# Patient Record
Sex: MALE | Race: WHITE | NOT HISPANIC OR LATINO | Employment: FULL TIME | URBAN - METROPOLITAN AREA
[De-identification: names, ages, dates, MRNs, and addresses within clinical notes are randomized per-mention and may not be internally consistent; named-entity substitution may affect disease eponyms.]

---

## 2018-02-20 ENCOUNTER — OFFICE VISIT (OUTPATIENT)
Dept: FAMILY MEDICINE CLINIC | Facility: CLINIC | Age: 38
End: 2018-02-20

## 2018-02-20 VITALS
DIASTOLIC BLOOD PRESSURE: 78 MMHG | HEART RATE: 94 BPM | HEIGHT: 67 IN | TEMPERATURE: 98 F | BODY MASS INDEX: 27.47 KG/M2 | SYSTOLIC BLOOD PRESSURE: 116 MMHG | RESPIRATION RATE: 16 BRPM | WEIGHT: 175 LBS

## 2018-02-20 DIAGNOSIS — J30.89 CHRONIC NON-SEASONAL ALLERGIC RHINITIS, UNSPECIFIED TRIGGER: Primary | ICD-10-CM

## 2018-02-20 PROCEDURE — 99213 OFFICE O/P EST LOW 20 MIN: CPT | Performed by: FAMILY MEDICINE

## 2018-02-20 RX ORDER — MONTELUKAST SODIUM 10 MG/1
10 TABLET ORAL NIGHTLY
Qty: 30 TABLET | Refills: 11 | Status: SHIPPED | OUTPATIENT
Start: 2018-02-20 | End: 2021-11-18

## 2018-02-20 NOTE — PROGRESS NOTES
"Subjective   Feliciano Billings is a 37 y.o. male.     CC: Sinus Issues    History of Present Illness     Pt comes in today reporting that he, since being moved to an older building at work with dust and chemicals, has had 3 sinus infections and a lot of nasal congestion and stuffiness (L>R). Has been using a NettiPott and is using Saline. Using Breath-Right strips, too. Pt did get allergy shots as a kid.      The following portions of the patient's history were reviewed and updated as appropriate: allergies, current medications, past family history, past medical history, past social history, past surgical history and problem list.    Review of Systems   Constitutional: Negative for activity change, chills, fatigue and fever.   HENT: Positive for congestion, postnasal drip and rhinorrhea.    Respiratory: Negative for cough and shortness of breath.    Cardiovascular: Negative for chest pain and palpitations.   Gastrointestinal: Negative for abdominal pain.   Endocrine: Negative for cold intolerance.   Psychiatric/Behavioral: Negative for behavioral problems and dysphoric mood. The patient is not nervous/anxious.      /78  Pulse 94  Temp 98 °F (36.7 °C) (Oral)   Resp 16  Ht 170.2 cm (67\")  Wt 79.4 kg (175 lb)  BMI 27.41 kg/m2    Objective   Physical Exam   Constitutional: He appears well-developed and well-nourished.   HENT:   Nose: Mucosal edema and nasal deformity (slight rightward deviation) present.   Neck: Neck supple. No thyromegaly present.   Cardiovascular: Normal rate and regular rhythm.    No murmur heard.  Pulmonary/Chest: Effort normal and breath sounds normal.   Abdominal: Bowel sounds are normal.   Psychiatric: He has a normal mood and affect. His behavior is normal.   Nursing note and vitals reviewed.      Assessment/Plan   Feliciano was seen today for sinusitis.    Diagnoses and all orders for this visit:    Chronic non-seasonal allergic rhinitis, unspecified trigger  -     montelukast " (SINGULAIR) 10 MG tablet; Take 1 tablet by mouth Every Night.    Pt to also start Nasacort and Allegra and gave # to Dr. Cris Martinez if needed.

## 2018-02-20 NOTE — PROGRESS NOTES
"Subjective   Feliciano Billings is a 37 y.o. male.     CC: URI    History of Present Illness     Feliciano Billings 37 y.o. male who presents for evaluation of {AD URI:1355284}. Symptoms include {AD URI c/o:73080}.  Onset of symptoms was {0-10:96843} {Time; units w/plural:11} ago, {course:17::\"unchanged\"} since that time. Patient denies {AD URI denies:20887}.   Evaluation to date: {Symptoms; uri:20318} Treatment to date:  {ADURITX:20322}.       The following portions of the patient's history were reviewed and updated as appropriate: allergies, current medications, past family history, past medical history, past social history, past surgical history and problem list.    Review of Systems   Constitutional: Negative for activity change, chills, fatigue and fever.   HENT: Positive for congestion, postnasal drip and sinus pressure.    Respiratory: Positive for cough. Negative for chest tightness.    Cardiovascular: Negative for chest pain and palpitations.   Gastrointestinal: Negative for abdominal pain and nausea.   Endocrine: Negative for cold intolerance and polydipsia.   Psychiatric/Behavioral: Negative for behavioral problems and dysphoric mood.   All other systems reviewed and are negative.    /78  Pulse 94  Temp 98 °F (36.7 °C) (Oral)   Resp 16  Ht 170.2 cm (67\")  Wt 79.4 kg (175 lb)  BMI 27.41 kg/m2    Objective   Physical Exam   Constitutional: He appears well-developed and well-nourished.   HENT:   Nose: Right sinus exhibits maxillary sinus tenderness. Left sinus exhibits maxillary sinus tenderness.   Neck: Neck supple. No thyromegaly present.   Cardiovascular: Normal rate and regular rhythm.    No murmur heard.  Pulmonary/Chest: Effort normal and breath sounds normal.   Abdominal: Bowel sounds are normal.   Psychiatric: He has a normal mood and affect. His behavior is normal.   Nursing note and vitals reviewed.      Assessment/Plan   Feliciano was seen today for sinusitis.    Diagnoses and all orders " for this visit:    Acute non-recurrent maxillary sinusitis

## 2019-08-08 ENCOUNTER — OFFICE VISIT (OUTPATIENT)
Dept: FAMILY MEDICINE CLINIC | Facility: CLINIC | Age: 39
End: 2019-08-08

## 2019-08-08 VITALS
OXYGEN SATURATION: 98 % | DIASTOLIC BLOOD PRESSURE: 85 MMHG | RESPIRATION RATE: 16 BRPM | HEIGHT: 68 IN | BODY MASS INDEX: 24.55 KG/M2 | WEIGHT: 162 LBS | HEART RATE: 78 BPM | SYSTOLIC BLOOD PRESSURE: 120 MMHG | TEMPERATURE: 98 F

## 2019-08-08 DIAGNOSIS — E78.2 MIXED HYPERLIPIDEMIA: ICD-10-CM

## 2019-08-08 DIAGNOSIS — M25.50 ARTHRALGIA, UNSPECIFIED JOINT: ICD-10-CM

## 2019-08-08 DIAGNOSIS — R19.4 BOWEL HABIT CHANGES: ICD-10-CM

## 2019-08-08 DIAGNOSIS — Z00.00 ANNUAL PHYSICAL EXAM: ICD-10-CM

## 2019-08-08 DIAGNOSIS — R60.0 LOCALIZED EDEMA: Primary | ICD-10-CM

## 2019-08-08 DIAGNOSIS — Z80.0 FAMILY HISTORY OF COLON CANCER: ICD-10-CM

## 2019-08-08 PROCEDURE — 99214 OFFICE O/P EST MOD 30 MIN: CPT | Performed by: FAMILY MEDICINE

## 2019-08-08 NOTE — PROGRESS NOTES
"Subjective   Feliciano Billings is a 39 y.o. male.     CC: Swelling/Rash    History of Present Illness     Pt comes in today with a roughly 12 month h/o swelling of the hands and feet, along with a more recent rash in the same region.  Pt reports a roughly 10 yr h/o off/on hand swelling and aches yet has come back about a year ago. 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 Getting some \"brain fog\" and fatigue issues, too.  Pt has had some 1 year h/o some bowel changes/bloating, etc, and father  63 (dx age 56) of colon cancer.    FH: mom and brother with HYPERthyroidism.    The following portions of the patient's history were reviewed and updated as appropriate: allergies, current medications, past family history, past medical history, past social history, past surgical history and problem list.    Review of Systems   Constitutional: Negative for activity change, chills, fatigue and fever.   Respiratory: Negative for cough and shortness of breath.    Cardiovascular: Negative for chest pain and palpitations.        Hand/feet swelling   Gastrointestinal: Negative for abdominal pain.   Endocrine: Negative for cold intolerance.   Psychiatric/Behavioral: Negative for behavioral problems and dysphoric mood. The patient is not nervous/anxious.        /85   Pulse 78   Temp 98 °F (36.7 °C) (Oral)   Resp 16   Ht 172.7 cm (68\")   Wt 73.5 kg (162 lb)   SpO2 98%   BMI 24.63 kg/m²     Objective   Physical Exam   Constitutional: He appears well-developed and well-nourished.   Neck: Neck supple. No thyromegaly present.   Cardiovascular: Normal rate and regular rhythm.   No murmur heard.  Pulmonary/Chest: Effort normal and breath sounds normal.   Abdominal: Bowel sounds are normal. There is no tenderness.   Psychiatric: He has a normal mood and affect. His behavior is normal.   Nursing note and vitals reviewed.      Assessment/Plan "   Feliciano was seen today for edema and rash.    Diagnoses and all orders for this visit:    Localized edema  -     T4, Free  -     Thyroid Stimulating Immunoglobulin  -     Comprehensive metabolic panel  -     CBC and Differential  -     TSH    Arthralgia, unspecified joint  -     Rheumatoid Factor  -     IVON  -     Uric Acid    Mixed hyperlipidemia  -     Lipid panel    Annual physical exam  Comments:  for labs only, don't bill  Orders:  -     Comprehensive metabolic panel  -     CBC and Differential  -     TSH    Bowel habit changes  -     Ambulatory Referral to Gastroenterology    Family history of colon cancer  -     Ambulatory Referral to Gastroenterology    Other orders  -     Cancel: Comprehensive Metabolic Panel  -     Cancel: CBC & Differential  -     Cancel: TSH    If all labs WNL, will consider a Sleep Consult.

## 2019-08-09 LAB
ALBUMIN SERPL-MCNC: 4.6 G/DL (ref 3.5–5.2)
ALBUMIN/GLOB SERPL: 1.6 G/DL
ALP SERPL-CCNC: 125 U/L (ref 39–117)
ALT SERPL-CCNC: 22 U/L (ref 1–41)
ANA SER QL: NEGATIVE
AST SERPL-CCNC: 21 U/L (ref 1–40)
BASOPHILS # BLD AUTO: 0.05 10*3/MM3 (ref 0–0.2)
BASOPHILS NFR BLD AUTO: 0.8 % (ref 0–1.5)
BILIRUB SERPL-MCNC: 0.6 MG/DL (ref 0.2–1.2)
BUN SERPL-MCNC: 10 MG/DL (ref 6–20)
BUN/CREAT SERPL: 15.9 (ref 7–25)
CALCIUM SERPL-MCNC: 9.4 MG/DL (ref 8.6–10.5)
CHLORIDE SERPL-SCNC: 100 MMOL/L (ref 98–107)
CHOLEST SERPL-MCNC: 164 MG/DL (ref 0–200)
CO2 SERPL-SCNC: 29.7 MMOL/L (ref 22–29)
CREAT SERPL-MCNC: 0.63 MG/DL (ref 0.76–1.27)
EOSINOPHIL # BLD AUTO: 0.26 10*3/MM3 (ref 0–0.4)
EOSINOPHIL NFR BLD AUTO: 4 % (ref 0.3–6.2)
ERYTHROCYTE [DISTWIDTH] IN BLOOD BY AUTOMATED COUNT: 12.5 % (ref 12.3–15.4)
GLOBULIN SER CALC-MCNC: 2.9 GM/DL
GLUCOSE SERPL-MCNC: 101 MG/DL (ref 65–99)
HCT VFR BLD AUTO: 44.6 % (ref 37.5–51)
HDLC SERPL-MCNC: 49 MG/DL (ref 40–60)
HGB BLD-MCNC: 14 G/DL (ref 13–17.7)
IMM GRANULOCYTES # BLD AUTO: 0.01 10*3/MM3 (ref 0–0.05)
IMM GRANULOCYTES NFR BLD AUTO: 0.2 % (ref 0–0.5)
LDLC SERPL CALC-MCNC: ABNORMAL MG/DL
LYMPHOCYTES # BLD AUTO: 2.25 10*3/MM3 (ref 0.7–3.1)
LYMPHOCYTES NFR BLD AUTO: 34.3 % (ref 19.6–45.3)
MCH RBC QN AUTO: 29.2 PG (ref 26.6–33)
MCHC RBC AUTO-ENTMCNC: 31.4 G/DL (ref 31.5–35.7)
MCV RBC AUTO: 93.1 FL (ref 79–97)
MONOCYTES # BLD AUTO: 0.42 10*3/MM3 (ref 0.1–0.9)
MONOCYTES NFR BLD AUTO: 6.4 % (ref 5–12)
NEUTROPHILS # BLD AUTO: 3.57 10*3/MM3 (ref 1.7–7)
NEUTROPHILS NFR BLD AUTO: 54.3 % (ref 42.7–76)
NRBC BLD AUTO-RTO: 0.2 /100 WBC (ref 0–0.2)
PLATELET # BLD AUTO: 348 10*3/MM3 (ref 140–450)
POTASSIUM SERPL-SCNC: 5.1 MMOL/L (ref 3.5–5.2)
PROT SERPL-MCNC: 7.5 G/DL (ref 6–8.5)
RBC # BLD AUTO: 4.79 10*6/MM3 (ref 4.14–5.8)
RHEUMATOID FACT SERPL-ACNC: <10 IU/ML (ref 0–13.9)
SODIUM SERPL-SCNC: 140 MMOL/L (ref 136–145)
T4 FREE SERPL-MCNC: 1.08 NG/DL (ref 0.93–1.7)
TRIGL SERPL-MCNC: 423 MG/DL (ref 0–150)
TSH SERPL DL<=0.005 MIU/L-ACNC: 1.19 MIU/ML (ref 0.27–4.2)
TSI SER-ACNC: <0.1 IU/L (ref 0–0.55)
URATE SERPL-MCNC: 5.8 MG/DL (ref 3.4–7)
VLDLC SERPL CALC-MCNC: ABNORMAL MG/DL
WBC # BLD AUTO: 6.56 10*3/MM3 (ref 3.4–10.8)

## 2019-08-14 ENCOUNTER — TELEPHONE (OUTPATIENT)
Dept: FAMILY MEDICINE CLINIC | Facility: CLINIC | Age: 39
End: 2019-08-14

## 2019-08-14 DIAGNOSIS — R53.82 CHRONIC FATIGUE: Primary | ICD-10-CM

## 2019-08-14 RX ORDER — ICOSAPENT ETHYL 1000 MG/1
2 CAPSULE ORAL 2 TIMES DAILY WITH MEALS
Qty: 120 CAPSULE | Refills: 5 | Status: SHIPPED | OUTPATIENT
Start: 2019-08-14 | End: 2021-11-18

## 2019-08-14 NOTE — TELEPHONE ENCOUNTER
Pt given the results of his and he agrees to the sleep medicine consult. Also pt is interested in taking something for his TG's

## 2019-08-14 NOTE — TELEPHONE ENCOUNTER
----- Message from Brent Hernandez MD sent at 8/10/2019 12:01 PM EDT -----  Please call the patient regarding his abnormal result. Also, TGs are terrible. Watch saturated Fats closely and eat healthy.

## 2019-09-18 ENCOUNTER — APPOINTMENT (OUTPATIENT)
Dept: SLEEP MEDICINE | Facility: HOSPITAL | Age: 39
End: 2019-09-18

## 2020-06-02 RX ORDER — FLUCONAZOLE 100 MG/1
TABLET ORAL
COMMUNITY
Start: 2020-04-17 | End: 2021-05-25 | Stop reason: SDUPTHER

## 2021-05-19 ENCOUNTER — HOSPITAL ENCOUNTER (EMERGENCY)
Facility: HOSPITAL | Age: 41
Discharge: HOME | End: 2021-05-19
Attending: EMERGENCY MEDICINE | Admitting: EMERGENCY MEDICINE

## 2021-05-19 VITALS
SYSTOLIC BLOOD PRESSURE: 123 MMHG | HEART RATE: 89 BPM | BODY MASS INDEX: 25.01 KG/M2 | WEIGHT: 165 LBS | DIASTOLIC BLOOD PRESSURE: 78 MMHG | TEMPERATURE: 97.6 F | OXYGEN SATURATION: 98 % | RESPIRATION RATE: 16 BRPM | HEIGHT: 68 IN

## 2021-05-19 DIAGNOSIS — F15.10 METHAMPHETAMINE ABUSE (HCC): ICD-10-CM

## 2021-05-19 DIAGNOSIS — F22 PARANOIA (HCC): ICD-10-CM

## 2021-05-19 DIAGNOSIS — R44.0 AUDITORY HALLUCINATIONS: Primary | ICD-10-CM

## 2021-05-19 PROCEDURE — 99282 EMERGENCY DEPT VISIT SF MDM: CPT

## 2021-05-19 PROCEDURE — 99281 EMR DPT VST MAYX REQ PHY/QHP: CPT

## 2021-05-24 NOTE — PROGRESS NOTES
Subjective   Feliciano Billings is a 41 y.o. male.     CC: Hallucinations    History of Present Illness     Pt returns today reporting hearing voices since last Summer. Pt was seen in the [facility] ED on 5/19/21 and that visit was as follows:    HPI:  Chief Complaint: auditory hallucinations     A complete HPI/ROS/PMH/PSH/SH/FH are unobtainable due to: none     Context: Feliciano Billings is a 41 y.o. male who presents to the ED for evaluation of auditory hallucinations that have been present for at least a couple of months and her constant severe and nothing really seems to make them worse or better.  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  He recently went to [facility] but left before his evaluation was complete due to fear, was admitted to an inpatient psychiatric facility and [place] a few weeks ago for 3 days and was told that his hallucinations were due to the amphetamine use and the longer he obtained and was sober the better that they would get.  He then went to a an inpatient rehab program and signed himself out after 10 days because he did not think he was getting any effect from it.  Today he signed himself out he used methamphetamine again.  His auditory hallucinations are always 2 different voices, his Malag[place] neighbor and his roommate and he states they constantly tell him that he needs to go to rehab, that his wife is cheating on him and using drugs.  He states when he tries to seek help they tell him to leave the place he is.  He states he is had depression anxiety prior to his methamphetamine use, has been in therapy years ago for it.  He denies any homicidal or suicidal ideation.  His wife states that 1 day last week he was suicidal and left the house and she was granted a mental and [facility] warrant.  He came home 3 hours later and nothing else came of that    Current outpatient and discharge medications have been reconciled for the patient.  Reviewed by: Brent Hernandez MD      Pt also  "has a several day h/o painful skin area of the right upper buttock w/o f/c. Pt with prior h/o MRSA.     The following portions of the patient's history were reviewed and updated as appropriate: allergies, current medications, past family history, past medical history, past social history, past surgical history and problem list.    Review of Systems   Constitutional: Negative for activity change, chills and fever.   Respiratory: Negative for cough.    Cardiovascular: Negative for chest pain.   Psychiatric/Behavioral: Positive for hallucinations. Negative for dysphoric mood and suicidal ideas.       BP (!) 123/109   Pulse 103   Temp 95.3 °F (35.2 °C) (Tympanic)   Resp 16   Ht 172.7 cm (68\")   Wt 76.7 kg (169 lb)   SpO2 93%   BMI 25.70 kg/m²     Objective   Physical Exam  Constitutional:       General: He is not in acute distress.     Appearance: He is well-developed.   Pulmonary:      Effort: Pulmonary effort is normal.   Neurological:      Mental Status: He is alert and oriented to person, place, and time.   Psychiatric:         Mood and Affect: Mood normal.         Behavior: Behavior normal.         Thought Content: Thought content normal.     Hospital records reviewed with pt confirming HPI.      Assessment/Plan   Diagnoses and all orders for this visit:    1. Auditory hallucinations (Primary)  -     Ambulatory Referral to Psychiatry  -     TSH  -     Comprehensive Metabolic Panel  -     CBC & Differential  -     Vitamin B12  -     MRI Brain Without Contrast; Future  -     Ambulatory Referral to Neurology    2. Paranoia (CMS/HCC)  -     Ambulatory Referral to Psychiatry  -     TSH  -     Comprehensive Metabolic Panel  -     CBC & Differential  -     Vitamin B12  -     MRI Brain Without Contrast; Future  -     Ambulatory Referral to Neurology    3. Cellulitis of buttock  -     doxycycline (VIBRAMYICN) 100 MG tablet; Take 1 tablet by mouth 2 (Two) Times a Day.  Dispense: 20 tablet; Refill: 0  -     fluconazole " (DIFLUCAN) 100 MG tablet; Take 1 tab QOD x 4 doses  Dispense: 4 tablet; Refill: 0

## 2021-05-25 ENCOUNTER — OFFICE VISIT (OUTPATIENT)
Dept: FAMILY MEDICINE CLINIC | Facility: CLINIC | Age: 41
End: 2021-05-25

## 2021-05-25 VITALS
DIASTOLIC BLOOD PRESSURE: 109 MMHG | OXYGEN SATURATION: 93 % | RESPIRATION RATE: 16 BRPM | TEMPERATURE: 95.3 F | WEIGHT: 169 LBS | HEART RATE: 103 BPM | SYSTOLIC BLOOD PRESSURE: 123 MMHG | BODY MASS INDEX: 25.61 KG/M2 | HEIGHT: 68 IN

## 2021-05-25 DIAGNOSIS — F22 PARANOIA (HCC): ICD-10-CM

## 2021-05-25 DIAGNOSIS — L03.317 CELLULITIS OF BUTTOCK: ICD-10-CM

## 2021-05-25 DIAGNOSIS — R44.0 AUDITORY HALLUCINATIONS: Primary | ICD-10-CM

## 2021-05-25 PROCEDURE — 99214 OFFICE O/P EST MOD 30 MIN: CPT | Performed by: FAMILY MEDICINE

## 2021-05-25 RX ORDER — IBUPROFEN 800 MG/1
TABLET ORAL
COMMUNITY
Start: 2021-05-04 | End: 2021-11-18

## 2021-05-25 RX ORDER — FLUCONAZOLE 100 MG/1
TABLET ORAL
Qty: 4 TABLET | Refills: 0 | Status: SHIPPED | OUTPATIENT
Start: 2021-05-25 | End: 2021-11-18

## 2021-05-25 RX ORDER — BUPRENORPHINE HYDROCHLORIDE AND NALOXONE HYDROCHLORIDE DIHYDRATE 2; .5 MG/1; MG/1
TABLET SUBLINGUAL
COMMUNITY
Start: 2021-05-15

## 2021-05-25 RX ORDER — DOXYCYCLINE HYCLATE 100 MG
100 TABLET ORAL 2 TIMES DAILY
Qty: 20 TABLET | Refills: 0 | Status: SHIPPED | OUTPATIENT
Start: 2021-05-25 | End: 2021-11-18

## 2021-05-26 LAB
ALBUMIN SERPL-MCNC: 4.6 G/DL (ref 3.5–5.2)
ALBUMIN/GLOB SERPL: 2.2 G/DL
ALP SERPL-CCNC: 137 U/L (ref 39–117)
ALT SERPL-CCNC: 26 U/L (ref 1–41)
AST SERPL-CCNC: 25 U/L (ref 1–40)
BASOPHILS # BLD AUTO: 0.05 10*3/MM3 (ref 0–0.2)
BASOPHILS NFR BLD AUTO: 0.5 % (ref 0–1.5)
BILIRUB SERPL-MCNC: 1.3 MG/DL (ref 0–1.2)
BUN SERPL-MCNC: 8 MG/DL (ref 6–20)
BUN/CREAT SERPL: 11.3 (ref 7–25)
CALCIUM SERPL-MCNC: 9.4 MG/DL (ref 8.6–10.5)
CHLORIDE SERPL-SCNC: 99 MMOL/L (ref 98–107)
CO2 SERPL-SCNC: 29.7 MMOL/L (ref 22–29)
CREAT SERPL-MCNC: 0.71 MG/DL (ref 0.76–1.27)
EOSINOPHIL # BLD AUTO: 0.17 10*3/MM3 (ref 0–0.4)
EOSINOPHIL NFR BLD AUTO: 1.6 % (ref 0.3–6.2)
ERYTHROCYTE [DISTWIDTH] IN BLOOD BY AUTOMATED COUNT: 13.2 % (ref 12.3–15.4)
GLOBULIN SER CALC-MCNC: 2.1 GM/DL
GLUCOSE SERPL-MCNC: 79 MG/DL (ref 65–99)
HCT VFR BLD AUTO: 38.9 % (ref 37.5–51)
HGB BLD-MCNC: 12.7 G/DL (ref 13–17.7)
IMM GRANULOCYTES # BLD AUTO: 0.05 10*3/MM3 (ref 0–0.05)
IMM GRANULOCYTES NFR BLD AUTO: 0.5 % (ref 0–0.5)
LYMPHOCYTES # BLD AUTO: 2.08 10*3/MM3 (ref 0.7–3.1)
LYMPHOCYTES NFR BLD AUTO: 19.9 % (ref 19.6–45.3)
MCH RBC QN AUTO: 27.4 PG (ref 26.6–33)
MCHC RBC AUTO-ENTMCNC: 32.6 G/DL (ref 31.5–35.7)
MCV RBC AUTO: 84 FL (ref 79–97)
MONOCYTES # BLD AUTO: 0.93 10*3/MM3 (ref 0.1–0.9)
MONOCYTES NFR BLD AUTO: 8.9 % (ref 5–12)
NEUTROPHILS # BLD AUTO: 7.16 10*3/MM3 (ref 1.7–7)
NEUTROPHILS NFR BLD AUTO: 68.6 % (ref 42.7–76)
NRBC BLD AUTO-RTO: 0 /100 WBC (ref 0–0.2)
PLATELET # BLD AUTO: 341 10*3/MM3 (ref 140–450)
POTASSIUM SERPL-SCNC: 4.4 MMOL/L (ref 3.5–5.2)
PROT SERPL-MCNC: 6.7 G/DL (ref 6–8.5)
RBC # BLD AUTO: 4.63 10*6/MM3 (ref 4.14–5.8)
SODIUM SERPL-SCNC: 137 MMOL/L (ref 136–145)
TSH SERPL DL<=0.005 MIU/L-ACNC: 0.94 UIU/ML (ref 0.27–4.2)
VIT B12 SERPL-MCNC: 782 PG/ML (ref 211–946)
WBC # BLD AUTO: 10.44 10*3/MM3 (ref 3.4–10.8)

## 2021-06-01 ENCOUNTER — TELEPHONE (OUTPATIENT)
Dept: FAMILY MEDICINE CLINIC | Facility: CLINIC | Age: 41
End: 2021-06-01

## 2021-06-01 NOTE — TELEPHONE ENCOUNTER
[phone] ( PHONE NUMBER ON FILE )  AND  [phone]  . STATES CALLS CAN NOT BE COMPLETED AT THIS TIME X 2  ELDERS

## 2021-06-01 NOTE — TELEPHONE ENCOUNTER
Caller: Ruby Corley    Relationship: Emergency Contact    Best call back number:[phone]    Caller requesting test results: PATIENTS WIFE    What test was performed: LAB WORK    When was the test performed: 05/25/21    Where was the test performed: OFFICE     Additional notes: PATIENTS WIFE CALLED REQUESTING THE RESULTS OF HIS LAB WORK DONE ON 05/25/21. SHE ALSO STATED THE CONTACTS THAT DR. BARCENAS GAVE HER TO FIND A PSYCHIATRIST FOR THE PATIENT DID NOT ACCEPT HIS MEDICAID INSURANCE. SHE WOULD LIKE TO BE RECOMMENDED NEW DOCTORS, SHE STATED PREFERABLY A DOCTOR WHO WORKS WITH DR. BARCENAS OR WITHIN [facility]. SHE ALSO WANTED AND UPDATE ON SCHEDULING AN MRI FOR THE PATIENT AND A REFERRAL TO A NEUROLOGIST, WHICH WAS RECOMMENDED BY DR. BARCENAS. PLEASE CALL AND ADVISE. PATIENT STATED SHE WANTED TO LET THE CLINICAL TEAM KNOW THIS IS URGENT.

## 2021-06-01 NOTE — TELEPHONE ENCOUNTER
I SPOKE WITH PATIENT REGARDING LAB RESULTS.  PT WOULD LIKE REFERRAL TO [facility] . ALSO PT HAS A SCHEDULED APPOINTMENT 6/24/2021 WITH DR VILLALOBOS .

## 2021-11-18 ENCOUNTER — TELEMEDICINE (OUTPATIENT)
Dept: FAMILY MEDICINE CLINIC | Facility: TELEHEALTH | Age: 41
End: 2021-11-18

## 2021-11-18 DIAGNOSIS — L02.31 ABSCESS OF BUTTOCK, RIGHT: Primary | ICD-10-CM

## 2021-11-18 PROCEDURE — 99203 OFFICE O/P NEW LOW 30 MIN: CPT | Performed by: NURSE PRACTITIONER

## 2021-11-18 RX ORDER — DOXYCYCLINE 100 MG/1
100 CAPSULE ORAL 2 TIMES DAILY
Qty: 20 CAPSULE | Refills: 0 | Status: SHIPPED | OUTPATIENT
Start: 2021-11-18 | End: 2021-11-28

## 2021-11-18 RX ORDER — FLUCONAZOLE 100 MG/1
100 TABLET ORAL EVERY OTHER DAY
Qty: 4 TABLET | Refills: 0 | Status: SHIPPED | OUTPATIENT
Start: 2021-11-18 | End: 2021-11-26

## 2021-11-18 RX ORDER — IBUPROFEN 800 MG/1
800 TABLET ORAL EVERY 6 HOURS PRN
Qty: 30 TABLET | Refills: 0 | Status: SHIPPED | OUTPATIENT
Start: 2021-11-18 | End: 2022-06-08

## 2021-11-18 RX ORDER — RISPERIDONE 4 MG/1
TABLET ORAL
COMMUNITY
Start: 2021-10-28

## 2021-11-18 NOTE — PATIENT INSTRUCTIONS
Skin Abscess    A skin abscess is an infected area on or under your skin that contains a collection of pus and other material. An abscess may also be called a furuncle, carbuncle, or boil. An abscess can occur in or on almost any part of your body.  Some abscesses break open (rupture) on their own. Most continue to get worse unless they are treated. The infection can spread deeper into the body and eventually into your blood, which can make you feel ill. Treatment usually involves draining the abscess.  What are the causes?  An abscess occurs when germs, like bacteria, pass through your skin and cause an infection. This may be caused by:  · A scrape or cut on your skin.  · A puncture wound through your skin, including a needle injection or insect bite.  · Blocked oil or sweat glands.  · Blocked and infected hair follicles.  · A cyst that forms beneath your skin (sebaceous cyst) and becomes infected.  What increases the risk?  This condition is more likely to develop in people who:  · Have a weak body defense system (immune system).  · Have diabetes.  · Have dry and irritated skin.  · Get frequent injections or use illegal IV drugs.  · Have a foreign body in a wound, such as a splinter.  · Have problems with their lymph system or veins.  What are the signs or symptoms?  Symptoms of this condition include:  · A painful, firm bump under the skin.  · A bump with pus at the top. This may break through the skin and drain.  Other symptoms include:  · Redness surrounding the abscess site.  · Warmth.  · Swelling of the lymph nodes (glands) near the abscess.  · Tenderness.  · A sore on the skin.  How is this diagnosed?  This condition may be diagnosed based on:  · A physical exam.  · Your medical history.  · A sample of pus. This may be used to find out what is causing the infection.  · Blood tests.  · Imaging tests, such as an ultrasound, CT scan, or MRI.  How is this treated?  A small abscess that drains on its own may not  need treatment. Treatment for larger abscesses may include:  · Moist heat or heat pack applied to the area several times a day.  · A procedure to drain the abscess (incision and drainage).  · Antibiotic medicines. For a severe abscess, you may first get antibiotics through an IV and then change to antibiotics by mouth.  Follow these instructions at home:  Medicines    · Take over-the-counter and prescription medicines only as told by your health care provider.  · If you were prescribed an antibiotic medicine, take it as told by your health care provider. Do not stop taking the antibiotic even if you start to feel better.    Abscess care    · If you have an abscess that has not drained, apply heat to the affected area. Use the heat source that your health care provider recommends, such as a moist heat pack or a heating pad.  ? Place a towel between your skin and the heat source.  ? Leave the heat on for 20-30 minutes.  ? Remove the heat if your skin turns bright red. This is especially important if you are unable to feel pain, heat, or cold. You may have a greater risk of getting burned.  · Follow instructions from your health care provider about how to take care of your abscess. Make sure you:  ? Cover the abscess with a bandage (dressing).  ? Change your dressing or gauze as told by your health care provider.  ? Wash your hands with soap and water before you change the dressing or gauze. If soap and water are not available, use hand .  · Check your abscess every day for signs of a worsening infection. Check for:  ? More redness, swelling, or pain.  ? More fluid or blood.  ? Warmth.  ? More pus or a bad smell.    General instructions  · To avoid spreading the infection:  ? Do not share personal care items, towels, or hot tubs with others.  ? Avoid making skin contact with other people.  · Keep all follow-up visits as told by your health care provider. This is important.  Contact a health care provider if  you have:  · More redness, swelling, or pain around your abscess.  · More fluid or blood coming from your abscess.  · Warm skin around your abscess.  · More pus or a bad smell coming from your abscess.  · A fever.  · Muscle aches.  · Chills or a general ill feeling.  Get help right away if you:  · Have severe pain.  · See red streaks on your skin spreading away from the abscess.  Summary  · A skin abscess is an infected area on or under your skin that contains a collection of pus and other material.  · A small abscess that drains on its own may not need treatment.  · Treatment for larger abscesses may include having a procedure to drain the abscess and taking an antibiotic.  This information is not intended to replace advice given to you by your health care provider. Make sure you discuss any questions you have with your health care provider.  Document Revised: 04/09/2020 Document Reviewed: 01/31/2019  [employer] Patient Education © 2021 [employer]    MRSA Infection, Diagnosis, Adult  Methicillin-resistant Staphylococcus aureus (MRSA) infection is caused by bacteria called Staphylococcus aureus, or staph, that no longer respond to common antibiotic medicines (drug-resistant bacteria). MRSA infection can be hard to treat.  Most of the time, MRSA can be on the skin or in the nose without causing problems (colonized). However, if MRSA enters the body through a cut, a sore, or an invasive medical device, it can cause a serious infection.  What are the causes?  This condition is caused by staph bacteria. Illness may develop after exposure to the bacteria through:  · Skin-to-skin contact with someone who is infected with MRSA.  · Touching surfaces that have the bacteria on them.  · Having a procedure or using equipment that allows MRSA to enter the body.  · Having MRSA that lives on your skin and then enters your body through:  ? A cut or scratch.  ? A surgery or procedure.  ? The use of a medical device.  Contact with  the bacteria may occur:  · During a stay in a hospital, rehabilitation facility, nursing home, or other health care facility (health care-associated MRSA).  · In daily activities where there is close contact with others, such as sports,  centers, or at home (community-associated MRSA).  What increases the risk?  You are more likely to develop this condition if you:  · Have a surgery or procedure.  · Have an IV or a thin tube (catheter) placed in your body.  · Are elderly.  · Are on kidney dialysis.  · Have recently taken an antibiotic medicine.  · Live in a long-term care facility.  · Have a chronic wound or skin ulcer.  · Have a weak body defense system (immune system).  · Play sports that involve skin-to-skin contact.  · Live in a crowded place, like a dormitory or [employer] barracks.  · Share towels, razors, or sports equipment with other people.  · Have a history of MRSA infection or colonization.  What are the signs or symptoms?  Symptoms of this condition depend on the area that is affected. Symptoms may include:  · A pus-filled pimple or boil.  · Pus that drains from your skin.  · A sore (abscess) under your skin or somewhere in your body.  · Fever with or without chills.  · Difficulty breathing.  · Coughing up blood.  · Redness, warmth, swelling, or pain in the affected area.  How is this diagnosed?  This condition may be diagnosed based on:  · A physical exam.  · Your medical history.  · Taking a sample from the infected area and growing it in a lab (culture).  You may also have other tests, including:  · Imaging tests, such as X-rays, a CT scan, or an MRI.  · Lab tests, such as blood, urine, or phlegm (sputum) tests.  You skin or nose may be swabbed when you are admitted to a health care facility for a procedure. This is to screen for MRSA.  How is this treated?  Treatment depends on the type of MRSA infection you have and how severe, deep, or extensive it is. Treatment may include:  · Antibiotic  medicines.  · Surgery to drain pus from the infected area.  Severe infections may require a hospital stay.  Follow these instructions at home:  Medicines  · Take over-the-counter and prescription medicines only as told by your health care provider.  · If you were prescribed an antibiotic medicine, use it as told by your health care provider. Do not stop using the antibiotic even if you start to feel better.  Prevention  Follow these instructions to avoid spreading the infection to others:  · Wash your hands frequently with soap and water. If soap and water are not available, use an alcohol-based hand .  · Avoid close contact with those around you as much as possible. Do not use towels, razors, toothbrushes, bedding, or other items that will be used by others.  · Wash towels, bedding, and clothes in the washing machine with detergent and hot water. Dry them in a hot dryer.  · Clean surfaces regularly to remove germs (disinfection). Use products or solutions that contain bleach. Make sure you disinfect bathroom surfaces, food preparation areas, exercise equipment, and doorknobs.    General instructions  · If you have a wound, follow instructions from your health care provider about how to take care of your wound.  ? Do not pick at scabs.  ? Do not try to drain any infection sites or pimples.  · Tell all your health care providers that you have MRSA, or if you have ever had a MRSA infection.  · Keep all follow-up visits as told by your health care provider. This is important.  Contact a health care provider if you:  · Do not get better.  · Have symptoms that get worse.  · Have new symptoms.  Get help right away if you have:  · Nausea or vomiting, or if you cannot take medicine without vomiting.  · Trouble breathing.  · Chest pain.  These symptoms may represent a serious problem that is an emergency. Do not wait to see if the symptoms will go away. Get medical help right away. Call your local emergency services  (911 in the [place] Do not drive yourself to the hospital.  Summary  · MRSA infection is caused by bacteria called Staphylococcus aureus, or staph, that no longer respond to common antibiotic medicines.  · Treatment for this condition depends on the type of MRSA infection you have and how severe, deep, and extensive it is.  · If you were prescribed an antibiotic medicine, use it as told by your health care provider. Do not stop using the antibiotic even if you start to feel better.  · Follow instructions from your health care provider to avoid spreading the infection to others.  This information is not intended to replace advice given to you by your health care provider. Make sure you discuss any questions you have with your health care provider.  Document Revised: 03/05/2020 Document Reviewed: 03/06/2020  [employer] Patient Education © 2021 [employer]    MRSA Infection, Self-Care, Adult  Methicillin-resistant Staphylococcus aureus (MRSA) infection is caused by bacteria that no longer respond to antibiotic medicines. MRSA infection can be hard to treat.  Self-care is important after being diagnosed with MRSA. Following instructions for self-care will:  · Help you heal well.  · Prevent the infection from spreading to others.  Your health care provider may also give you more specific instructions. If you have problems or questions, contact your health care provider.  What are the risks?  MRSA can be on the skin or in the nose of many people without causing problems. This is called MRSA colonization. However, MRSA can cause problems for you and others if it enters the body through a cut, a sore, or an invasive medical procedure or device.  · If MRSA enters your body, it may cause serious problems, such as:  ? Skin infections.  ? Bone or joint infections.  ? Pneumonia.  ? Bloodstream infections (sepsis).  · If you have these infections, MRSA may spread to others, including:  ? Visitors or other patients in the  hospital.  ? Friends, family, or other people at home or in your community.  Supplies needed:  · Soap and water.  · Germ-free (sterile) dressing.  · Alcohol-based hand  (optional).  · Home cleaning solutions that contain bleach.  · Clean or disposable towels.  How to prevent MRSA infections  Take these steps to avoid getting another MRSA infection and to prevent the bacteria from spreading to others.  Hand washing    · Wash your hands frequently with soap and water. If soap and water are not available, use an alcohol-based hand . Dry your hands with a clean or disposable towel.  · Make sure that everyone in the household washes their hands often.    Wound care   If you have a wound, follow instructions from your health care provider about how to take care of your wound. Make sure you:  · Wash your hands with soap and water before and after you change your bandage (dressing). If soap and water are not available, use an alcohol-based hand .  · Change your dressing as told by your health care provider.  · Leave any stitches (sutures), skin glue, or adhesive strips in place. These skin closures may need to be in place for 2 weeks or longer. If adhesive strip edges start to loosen and curl up, you may trim the loose edges. Do not remove adhesive strips completely unless your health care provider tells you to do that.  · Clean wounds, cuts, and abrasions with soap and water and cover them with dry, sterile dressings until they heal.  · Check your wound every day for signs of infection. Check for:  ? More redness, swelling, or pain.  ? More fluid or blood.  ? Warmth.  ? Pus or a bad smell.  · If you have a wound that seems to be infected, ask your health care provider if a culture should be done for MRSA and other bacteria.  Personal hygiene  · Maintain good hygiene by bathing often and keeping your body clean.  · Wash hands before preparing food.  · Always shower after exercising.  · Wash  towels, bedding, and clothes in the washing machine with detergent and hot water. Dry them in a hot dryer.  General tips  · Take your antibiotic medicine as told by your health care provider. Take them only when absolutely necessary. Do not stop taking the antibiotic even if you start to feel better.  · Avoid close contact with others as much as possible.  · Do not use towels, razors, toothbrushes, bedding, or other items that will be used by others.  · Clean surfaces regularly to remove germs (disinfection). Use products or solutions that contain bleach. Make sure you disinfect bathroom surfaces, food preparation areas, and doorknobs.  Follow these instructions at home:  · Take over-the-counter and prescription medicines only as told by your health care provider.  · Tell all health care providers who care for you that you have MRSA or that you have had MRSA.  · If you are breastfeeding, talk to your health care provider about MRSA. You may be asked to temporarily stop breastfeeding.  · If you have an invasive medical device, make sure that you know how to take care of it to prevent infection.  · Keep all follow-up visits as told by your health care provider. This is important.  Contact a health care provider if:  · Your infection seems to be getting worse. Signs may include:  ? Warmth, redness, or tenderness around your wound site.  ? A red line that spreads from your infection site.  ? A dark color in the area around your infection.  ? Wound drainage that is tan, yellow, or green.  ? A bad smell coming from your wound.  · You have symptoms of a new MRSA infection:  ? A pus-filled pimple.  ? A boil on your skin.  ? Pus draining from your skin.  ? A sore (abscess) under your skin or somewhere in your body.  ? Fever with or without chills.  Get help right away if:  · You have trouble breathing.  · You feel nauseous, you vomit, or you cannot take medicine without vomiting.  · You have chest pain.  These symptoms may  represent a serious problem that is an emergency. Do not wait to see if the symptoms will go away. Get medical help right away. Call your local emergency services (911 in the [place] Do not drive yourself to the hospital.  Summary  · Self-care is important after being diagnosed with MRSA. This will help you heal well and prevent the infection from spreading to others.  · Wash your hands often, avoid close contact with others, and avoid sharing towels, razors, toothbrushes, and bedding with other people. This will help prevent the infection from spreading to others.  · If you are being treated for a MRSA infection, make sure to take over-the-counter and prescription medicines as told. Finish all antibiotic medicine even if you start to feel better.  · If you have a wound, follow instructions from your health care provider about how to take care of it. Check your wound every day for signs of infection.  This information is not intended to replace advice given to you by your health care provider. Make sure you discuss any questions you have with your health care provider.  Document Revised: 03/05/2020 Document Reviewed: 03/06/2020  [employer] Patient Education © 2021 [employer]

## 2021-11-18 NOTE — PROGRESS NOTES
You have chosen to receive care through a telehealth visit.  Do you consent to use a video/audio connection for your medical care today? Yes     CHIEF COMPLAINT  Chief Complaint   Patient presents with   • Cellulitis         HPI  Feliciano Billings is a 41 y.o. male  presents with complaint of recurring MRSA on right buttock, swollen, red, painful.  Began about 2 days ago.  Began draining last night, thick blood discharge. Usually takes doxycycline and fluconazole when he has a recurrence.     Review of Systems   Skin: Positive for wound.   All other systems reviewed and are negative.      Past Medical History:   Diagnosis Date   • Allergic    • H/O complete eye exam 06/2015   • Hyperlipidemia        Family History   Problem Relation Age of Onset   • Thyroid disease Mother    • Cancer Father    • Colon cancer Father    • Diabetes Neg Hx    • Heart disease Neg Hx    • Hypertension Neg Hx    • Alcohol abuse Neg Hx        Social History     Socioeconomic History   • Marital status:    Tobacco Use   • Smoking status: Current Every Day Smoker     Packs/day: 1.00     Types: Cigarettes   • Smokeless tobacco: Never Used   Substance and Sexual Activity   • Alcohol use: Yes     Comment: SOCIAL   • Drug use: Defer         There were no vitals taken for this visit.    PHYSICAL EXAM  Physical Exam   Constitutional: He is oriented to person, place, and time. He appears well-developed and well-nourished. He does not have a sickly appearance. He does not appear ill.   HENT:   Head: Normocephalic and atraumatic.   Pulmonary/Chest: Effort normal.  No respiratory distress.  Neurological: He is alert and oriented to person, place, and time.         Diagnoses and all orders for this visit:    1. Abscess of buttock, right (Primary)  -     doxycycline (MONODOX) 100 MG capsule; Take 1 capsule by mouth 2 (Two) Times a Day for 10 days.  Dispense: 20 capsule; Refill: 0  -     fluconazole (Diflucan) 100 MG tablet; Take 1 tablet by mouth  Every Other Day for 8 days.  Dispense: 4 tablet; Refill: 0  -     ibuprofen (ADVIL,MOTRIN) 800 MG tablet; Take 1 tablet by mouth Every 6 (Six) Hours As Needed for Mild Pain .  Dispense: 30 tablet; Refill: 0    --take all medications as prescribed  --if no improvement in 3-5 days, will need follow-up for further evaluation      FOLLOW-UP  As discussed during visit with PCP/[facility] if no improvement or Urgent Care/Emergency Department if worsening of symptoms    Patient verbalizes understanding of medication dosage, comfort measures, instructions for treatment and follow-up.    Dixie Caban, APRN  11/18/2021  10:40 EST    This visit was performed via Telehealth.  This patient has been instructed to follow-up with their primary care provider if their symptoms worsen or the treatment provided does not resolve their illness.

## 2022-06-08 ENCOUNTER — TELEMEDICINE (OUTPATIENT)
Dept: FAMILY MEDICINE CLINIC | Facility: TELEHEALTH | Age: 42
End: 2022-06-08

## 2022-06-08 VITALS — HEIGHT: 68 IN | WEIGHT: 169 LBS | BODY MASS INDEX: 25.61 KG/M2

## 2022-06-08 DIAGNOSIS — S31.819A WOUND OF RIGHT BUTTOCK, INITIAL ENCOUNTER: Primary | ICD-10-CM

## 2022-06-08 PROCEDURE — 99213 OFFICE O/P EST LOW 20 MIN: CPT | Performed by: NURSE PRACTITIONER

## 2022-06-08 RX ORDER — MUPIROCIN CALCIUM 20 MG/G
1 CREAM TOPICAL 2 TIMES DAILY
Qty: 22 G | Refills: 0 | Status: SHIPPED | OUTPATIENT
Start: 2022-06-08 | End: 2022-06-18

## 2022-06-08 RX ORDER — DOXYCYCLINE 100 MG/1
100 CAPSULE ORAL 2 TIMES DAILY
Qty: 20 CAPSULE | Refills: 0 | Status: SHIPPED | OUTPATIENT
Start: 2022-06-08 | End: 2022-06-18

## 2022-06-08 RX ORDER — FLUCONAZOLE 150 MG/1
TABLET ORAL
Qty: 3 TABLET | Refills: 0 | Status: SHIPPED | OUTPATIENT
Start: 2022-06-08 | End: 2022-07-13 | Stop reason: SDUPTHER

## 2022-06-08 RX ORDER — ESCITALOPRAM OXALATE 10 MG/1
TABLET ORAL
COMMUNITY
Start: 2022-05-19

## 2022-06-08 RX ORDER — BUPRENORPHINE HYDROCHLORIDE AND NALOXONE HYDROCHLORIDE DIHYDRATE 8; 2 MG/1; MG/1
TABLET SUBLINGUAL
COMMUNITY
Start: 2022-05-19

## 2022-06-08 NOTE — PROGRESS NOTES
You have chosen to receive care through a telehealth visit.  Do you consent to use a video/audio connection for your medical care today? Yes     CHIEF COMPLAINT  Cc: wound, recurring MRSA    HPI  Feliciano Billings is a 42 y.o. male  presents with complaint of a wound infection and recurring MRSA. He has a wound on his right buttock. It started yesterday. It is red and sore. It is the size of a grapefruit and has a dark spot in the middle. He has had this in the same spot in the past. No fever is reported. He reports that he needs doxycycline and diflucan. Addtionally he reports that he takes the antibiotic and then gets yeast in the wound sometimes so bad that he cannot complete the antibiotic.    Review of Systems   Constitutional: Negative for fever.   HENT: Negative for congestion and sore throat.    Respiratory: Negative for cough.    Cardiovascular: Negative for chest pain.   Gastrointestinal: Negative for diarrhea and nausea.   Musculoskeletal: Negative for myalgias.   Skin: Wound: right buttock, red, painful, warm to touch with black spot in middle,   Neurological: Negative for headaches.       Past Medical History:   Diagnosis Date   • Allergic    • H/O complete eye exam 06/2015   • Hyperlipidemia    • MRSA (methicillin resistant Staphylococcus aureus)        Family History   Problem Relation Age of Onset   • Thyroid disease Mother    • Cancer Father    • Colon cancer Father    • Diabetes Neg Hx    • Heart disease Neg Hx    • Hypertension Neg Hx    • Alcohol abuse Neg Hx        Social History     Socioeconomic History   • Marital status:    Tobacco Use   • Smoking status: Current Every Day Smoker     Packs/day: 1.00     Types: Cigarettes   • Smokeless tobacco: Never Used   Substance and Sexual Activity   • Alcohol use: Yes     Comment: SOCIAL   • Drug use: Defer       Feliciano Billings  reports that he has been smoking cigarettes. He has been smoking about 1.00 pack per day. He has never used  "smokeless tobacco.. I have educated him on the risk of diseases from using tobacco products such as cancer, COPD and heart disease.     I advised him to quit and he is willing to quit.  He will try to quit one month form today and he needs help with this he will follow up with his primary care provider    I spent 3  minutes counseling the patient.       Ht 172.7 cm (68\")   Wt 76.7 kg (169 lb)   BMI 25.70 kg/m²     PHYSICAL EXAM  Physical Exam   Constitutional: He is oriented to person, place, and time. He appears well-developed and well-nourished.   HENT:   Head: Normocephalic and atraumatic.   Right Ear: External ear normal.   Left Ear: External ear normal.   Nose: Nose normal.   Mouth/Throat: Mouth/Lips are normal.  Eyes: Lids are normal. Right eye exhibits no discharge and no exudate. Left eye exhibits no discharge and no exudate. Right conjunctiva is not injected. Left conjunctiva is not injected.   Pulmonary/Chest: No accessory muscle usage. No tachypnea and no bradypnea.  No respiratory distress.No use of oxygen by nasal cannulaNo use of oxygen by mask noted.  Abdominal: Abdomen appears normal.   Neurological: He is alert and oriented to person, place, and time. No cranial nerve deficit.   Skin:   Right buttock with grapefruit sized erythematous region with dark central lesion His skin appears normal.  Psychiatric: He has a normal mood and affect. His speech is normal and behavior is normal. Judgment and thought content normal.       Results for orders placed or performed in visit on 05/25/21   TSH    Specimen: Blood   Result Value Ref Range    TSH 0.945 0.270 - 4.200 uIU/mL   Comprehensive Metabolic Panel    Specimen: Blood   Result Value Ref Range    Glucose 79 65 - 99 mg/dL    BUN 8 6 - 20 mg/dL    Creatinine 0.71 (L) 0.76 - 1.27 mg/dL    eGFR Non African Am 122 >60 mL/min/1.73    eGFR African Am 147 >60 mL/min/1.73    BUN/Creatinine Ratio 11.3 7.0 - 25.0    Sodium 137 136 - 145 mmol/L    Potassium 4.4 " 3.5 - 5.2 mmol/L    Chloride 99 98 - 107 mmol/L    Total CO2 29.7 (H) 22.0 - 29.0 mmol/L    Calcium 9.4 8.6 - 10.5 mg/dL    Total Protein 6.7 6.0 - 8.5 g/dL    Albumin 4.60 3.50 - 5.20 g/dL    Globulin 2.1 gm/dL    A/G Ratio 2.2 g/dL    Total Bilirubin 1.3 (H) 0.0 - 1.2 mg/dL    Alkaline Phosphatase 137 (H) 39 - 117 U/L    AST (SGOT) 25 1 - 40 U/L    ALT (SGPT) 26 1 - 41 U/L   Vitamin B12    Specimen: Blood   Result Value Ref Range    Vitamin B-12 782 211 - 946 pg/mL   CBC & Differential    Specimen: Blood   Result Value Ref Range    WBC 10.44 3.40 - 10.80 10*3/mm3    RBC 4.63 4.14 - 5.80 10*6/mm3    Hemoglobin 12.7 (L) 13.0 - 17.7 g/dL    Hematocrit 38.9 37.5 - 51.0 %    MCV 84.0 79.0 - 97.0 fL    MCH 27.4 26.6 - 33.0 pg    MCHC 32.6 31.5 - 35.7 g/dL    RDW 13.2 12.3 - 15.4 %    Platelets 341 140 - 450 10*3/mm3    Neutrophil Rel % 68.6 42.7 - 76.0 %    Lymphocyte Rel % 19.9 19.6 - 45.3 %    Monocyte Rel % 8.9 5.0 - 12.0 %    Eosinophil Rel % 1.6 0.3 - 6.2 %    Basophil Rel % 0.5 0.0 - 1.5 %    Neutrophils Absolute 7.16 (H) 1.70 - 7.00 10*3/mm3    Lymphocytes Absolute 2.08 0.70 - 3.10 10*3/mm3    Monocytes Absolute 0.93 (H) 0.10 - 0.90 10*3/mm3    Eosinophils Absolute 0.17 0.00 - 0.40 10*3/mm3    Basophils Absolute 0.05 0.00 - 0.20 10*3/mm3    Immature Granulocyte Rel % 0.5 0.0 - 0.5 %    Immature Grans Absolute 0.05 0.00 - 0.05 10*3/mm3    nRBC 0.0 0.0 - 0.2 /100 WBC       Diagnoses and all orders for this visit:    1. Wound of right buttock, initial encounter (Primary)    Other orders  -     doxycycline (MONODOX) 100 MG capsule; Take 1 capsule by mouth 2 (Two) Times a Day for 10 days.  Dispense: 20 capsule; Refill: 0  -     mupirocin (Bactroban) 2 % cream; Apply 1 application topically to the appropriate area as directed 2 (Two) Times a Day for 10 days.  Dispense: 22 g; Refill: 0  -     fluconazole (Diflucan) 150 MG tablet; 150 mg tabs on days #1, 4 and 7 post antibiotics  Dispense: 3 tablet; Refill: 0    Take the  doxycycline on an empty stomach with a full glass of water. Do not take minerals or vitamins with minerals with this antibiotic as it decreases the therapeutic value of this antibiotic related to absorption.  Probiotics for one month related to taking antibiotics. The pharmacist can help you with this if needed. Do not take within two hours of antibiotic.  Clean wound with mild soap and water and apply mupirocin ointment twice daily  Leave area open to air as much as possible(yeast loves warm, dark, moist places)      FOLLOW-UP  If symptoms worsen or persist follow up with PCP, Urgent Care or ER dependent on severity of symptoms    Patient verbalizes understanding of medication dosage, comfort measures, instructions for treatment and follow-up.    KAITLYNN Louie  06/08/2022  15:46 EDT    The use of a video visit has been reviewed with the patient and verbal informed consent has been obtained. Myself and Feliciano Billings participated in this visit. The patient is located in [address] [place] [address].    I am located in [place], [place]. Mychart and Zoom were utilized. I spent 25 minutes in the patient's chart for this visit.

## 2022-07-13 ENCOUNTER — TELEMEDICINE (OUTPATIENT)
Dept: FAMILY MEDICINE CLINIC | Facility: TELEHEALTH | Age: 42
End: 2022-07-13

## 2022-07-13 DIAGNOSIS — L03.317 CELLULITIS AND ABSCESS OF BUTTOCK: Primary | ICD-10-CM

## 2022-07-13 DIAGNOSIS — L02.31 CELLULITIS AND ABSCESS OF BUTTOCK: Primary | ICD-10-CM

## 2022-07-13 PROCEDURE — 99213 OFFICE O/P EST LOW 20 MIN: CPT | Performed by: NURSE PRACTITIONER

## 2022-07-13 RX ORDER — FLUCONAZOLE 150 MG/1
TABLET ORAL
Qty: 3 TABLET | Refills: 0 | Status: SHIPPED | OUTPATIENT
Start: 2022-07-13

## 2022-07-13 RX ORDER — DOXYCYCLINE HYCLATE 100 MG/1
100 CAPSULE ORAL 2 TIMES DAILY
Qty: 20 CAPSULE | Refills: 0 | Status: SHIPPED | OUTPATIENT
Start: 2022-07-13 | End: 2022-07-23

## 2022-07-13 NOTE — PROGRESS NOTES
You have chosen to receive care through a telehealth visit.  Do you consent to use a video/audio connection for your medical care today? Yes     CHIEF COMPLAINT  Chief Complaint   Patient presents with   • Recurrent Skin Infections     MRSA on right Buttocks. ( Reoccurring)          HPI  Feliciano Billings is a 42 y.o. male  presents with complaint of a skin sore on right buttock that has become red and tender. It is becoming larger and he suspect it is a MRSA skin infection which he has had in a similar location numerous times. He reports pain at the site but no fever. The area is red and warm per patient. He is requesting an antimitotic.     Review of Systems   Constitutional: Negative.    HENT: Negative.    Respiratory: Negative.    Cardiovascular: Negative.    Skin: Positive for color change and wound.        Red sore bump on right buttock   Psychiatric/Behavioral: Negative.        Past Medical History:   Diagnosis Date   • Allergic    • H/O complete eye exam 06/2015   • Hyperlipidemia    • MRSA (methicillin resistant Staphylococcus aureus)        Family History   Problem Relation Age of Onset   • Thyroid disease Mother    • Cancer Father    • Colon cancer Father    • Diabetes Neg Hx    • Heart disease Neg Hx    • Hypertension Neg Hx    • Alcohol abuse Neg Hx        Social History     Socioeconomic History   • Marital status:    Tobacco Use   • Smoking status: Current Every Day Smoker     Packs/day: 1.00     Types: Cigarettes   • Smokeless tobacco: Never Used   Substance and Sexual Activity   • Alcohol use: Yes     Comment: SOCIAL   • Drug use: Defer       Feliciano Billings  reports that he has been smoking cigarettes. He has been smoking about 1.00 pack per day. He has never used smokeless tobacco..       There were no vitals taken for this visit.    PHYSICAL EXAM  Physical Exam   Constitutional: He is oriented to person, place, and time. He appears well-developed and well-nourished. He does not have a  sickly appearance. He does not appear ill. No distress.   HENT:   Head: Normocephalic and atraumatic.   Pulmonary/Chest: Effort normal.   Neurological: He is alert and oriented to person, place, and time.   Skin: Skin is warm and intact. Rash noted. Rash is papular. There is erythema.        Right buttock with dime size papule with a red indurated Elem surrounding this the size of an orange. Area is without drainage    Psychiatric: He has a normal mood and affect.   Vitals reviewed.      Results for orders placed or performed in visit on 05/25/21   TSH    Specimen: Blood   Result Value Ref Range    TSH 0.945 0.270 - 4.200 uIU/mL   Comprehensive Metabolic Panel    Specimen: Blood   Result Value Ref Range    Glucose 79 65 - 99 mg/dL    BUN 8 6 - 20 mg/dL    Creatinine 0.71 (L) 0.76 - 1.27 mg/dL    eGFR Non African Am 122 >60 mL/min/1.73    eGFR African Am 147 >60 mL/min/1.73    BUN/Creatinine Ratio 11.3 7.0 - 25.0    Sodium 137 136 - 145 mmol/L    Potassium 4.4 3.5 - 5.2 mmol/L    Chloride 99 98 - 107 mmol/L    Total CO2 29.7 (H) 22.0 - 29.0 mmol/L    Calcium 9.4 8.6 - 10.5 mg/dL    Total Protein 6.7 6.0 - 8.5 g/dL    Albumin 4.60 3.50 - 5.20 g/dL    Globulin 2.1 gm/dL    A/G Ratio 2.2 g/dL    Total Bilirubin 1.3 (H) 0.0 - 1.2 mg/dL    Alkaline Phosphatase 137 (H) 39 - 117 U/L    AST (SGOT) 25 1 - 40 U/L    ALT (SGPT) 26 1 - 41 U/L   Vitamin B12    Specimen: Blood   Result Value Ref Range    Vitamin B-12 782 211 - 946 pg/mL   CBC & Differential    Specimen: Blood   Result Value Ref Range    WBC 10.44 3.40 - 10.80 10*3/mm3    RBC 4.63 4.14 - 5.80 10*6/mm3    Hemoglobin 12.7 (L) 13.0 - 17.7 g/dL    Hematocrit 38.9 37.5 - 51.0 %    MCV 84.0 79.0 - 97.0 fL    MCH 27.4 26.6 - 33.0 pg    MCHC 32.6 31.5 - 35.7 g/dL    RDW 13.2 12.3 - 15.4 %    Platelets 341 140 - 450 10*3/mm3    Neutrophil Rel % 68.6 42.7 - 76.0 %    Lymphocyte Rel % 19.9 19.6 - 45.3 %    Monocyte Rel % 8.9 5.0 - 12.0 %    Eosinophil Rel % 1.6 0.3 - 6.2 %     Basophil Rel % 0.5 0.0 - 1.5 %    Neutrophils Absolute 7.16 (H) 1.70 - 7.00 10*3/mm3    Lymphocytes Absolute 2.08 0.70 - 3.10 10*3/mm3    Monocytes Absolute 0.93 (H) 0.10 - 0.90 10*3/mm3    Eosinophils Absolute 0.17 0.00 - 0.40 10*3/mm3    Basophils Absolute 0.05 0.00 - 0.20 10*3/mm3    Immature Granulocyte Rel % 0.5 0.0 - 0.5 %    Immature Grans Absolute 0.05 0.00 - 0.05 10*3/mm3    nRBC 0.0 0.0 - 0.2 /100 WBC       Diagnoses and all orders for this visit:    1. Cellulitis and abscess of buttock (Primary)  -     doxycycline (VIBRAMYCIN) 100 MG capsule; Take 1 capsule by mouth 2 (Two) Times a Day for 10 days.  Dispense: 20 capsule; Refill: 0  -     fluconazole (Diflucan) 150 MG tablet; 150 mg tabs on days #1, 4 and 7 post antibiotics  Dispense: 3 tablet; Refill: 0  -     mupirocin (BACTROBAN) 2 % ointment; Apply 1 application topically to the appropriate area as directed 3 (Three) Times a Day.  Dispense: 30 g; Refill: 0    keep area clean and dry.   Good hand washing before and after applying ointment.     Warm compresses   IBU as directed prn pain.       FOLLOW-UP  As discussed during visit with PCP/[facility] if no improvement or Urgent Care/Emergency Department if worsening of symptoms    Patient verbalizes understanding of medication dosage, comfort measures, instructions for treatment and follow-up.    Laine Venegas, APRN  07/13/2022  12:31 EDT    The use of a video visit has been reviewed with the patient and verbal informed consent has been obtained. Myself and Feliciano Billings participated in this visit. The patient is located in [address] [place] [address].    I am located in [place], [place]. Mychart and Zoom were utilized. I spent  15  minutes in the patient's chart for this visit.

## 2024-01-16 ENCOUNTER — PATIENT ROUNDING (BHMG ONLY) (OUTPATIENT)
Age: 44
End: 2024-01-16
Payer: COMMERCIAL

## 2024-01-16 NOTE — ED NOTES
Thank you for letting us care for you in your recent visit to our urgent care center. We would love to hear about your experience with us. Was this the first time you have visited our location?    We’re always looking for ways to make our patients’ experiences even better. Do you have any recommendations on ways we may improve?     I appreciate you taking the time to respond. Please be on the lookout for a survey about your recent visit from [employer] via text or email. We would greatly appreciate if you could fill that out and turn it back in. We want your voice to be heard and we value your feedback.   Thank you for choosing [facility] for your healthcare needs.

## 2024-06-17 ENCOUNTER — OFFICE VISIT (OUTPATIENT)
Dept: FAMILY MEDICINE CLINIC | Facility: CLINIC | Age: 44
End: 2024-06-17
Payer: COMMERCIAL

## 2024-06-17 VITALS
OXYGEN SATURATION: 98 % | DIASTOLIC BLOOD PRESSURE: 84 MMHG | RESPIRATION RATE: 16 BRPM | BODY MASS INDEX: 26.36 KG/M2 | HEIGHT: 69 IN | HEART RATE: 73 BPM | SYSTOLIC BLOOD PRESSURE: 128 MMHG | WEIGHT: 178 LBS

## 2024-06-17 DIAGNOSIS — Z12.5 SCREENING FOR PROSTATE CANCER: ICD-10-CM

## 2024-06-17 DIAGNOSIS — Z12.11 SCREENING FOR COLORECTAL CANCER: ICD-10-CM

## 2024-06-17 DIAGNOSIS — Z80.0 FAMILY HISTORY OF COLORECTAL CANCER: ICD-10-CM

## 2024-06-17 DIAGNOSIS — Z00.00 LABORATORY EXAM ORDERED AS PART OF ROUTINE GENERAL MEDICAL EXAMINATION: ICD-10-CM

## 2024-06-17 DIAGNOSIS — Z12.12 SCREENING FOR COLORECTAL CANCER: ICD-10-CM

## 2024-06-17 DIAGNOSIS — Z00.00 WELLNESS EXAMINATION: Primary | ICD-10-CM

## 2024-06-17 DIAGNOSIS — E78.2 MIXED HYPERLIPIDEMIA: ICD-10-CM

## 2024-06-17 PROCEDURE — 99396 PREV VISIT EST AGE 40-64: CPT | Performed by: NURSE PRACTITIONER

## 2024-06-17 NOTE — PROGRESS NOTES
Subjective   Feliciano Billings is a 44 y.o. male.     History of Present Illness   Feliciano Billings 44 y.o. male who presents today for a new patient appointment.    he has a history of   Patient Active Problem List   Diagnosis    Anxiety    Auditory hallucinations   .  he is here for a wellness check up and is here to re-establish care I reviewed the PFSH recorded today by my MA/LPN staff.   he   He has been feeling well.    Preventative counseling provided and diet/exercise and vaccinations.     Patient quit smoking in September. He smoked 1 PPD for 20 years.     Patient is currently on Suboxone which she gets through the clinic.  He does not take any other prescription medications.    He does have family history of colorectal cancer.  States his father had colon cancer in his 60s.  Patient has never had colonoscopy.    The following portions of the patient's history were reviewed and updated as appropriate: allergies, current medications, past family history, past medical history, past social history, past surgical history, and problem list.    Review of Systems   Constitutional:  Negative for fatigue.   Respiratory:  Negative for cough and shortness of breath.    Cardiovascular:  Negative for chest pain and palpitations.   Skin:  Negative for rash.   Psychiatric/Behavioral:  Negative for dysphoric mood and sleep disturbance. The patient is not nervous/anxious.        Objective   Physical Exam  Vitals and nursing note reviewed.   Constitutional:       Appearance: Normal appearance. He is well-developed.   Cardiovascular:      Rate and Rhythm: Normal rate and regular rhythm.   Pulmonary:      Effort: Pulmonary effort is normal.      Breath sounds: Normal breath sounds.   Neurological:      Mental Status: He is alert and oriented to person, place, and time.   Psychiatric:         Mood and Affect: Mood normal.         Behavior: Behavior normal.         Thought Content: Thought content normal.         Judgment:  Judgment normal.         Assessment & Plan   Diagnoses and all orders for this visit:    1. Wellness examination (Primary)    2. Family history of colorectal cancer  -     Ambulatory Referral For Screening Colonoscopy    3. Screening for colorectal cancer  -     Ambulatory Referral For Screening Colonoscopy    4. Laboratory exam ordered as part of routine general medical examination  -     Comprehensive metabolic panel  -     Lipid panel  -     CBC and Differential  -     TSH  -     T4, free  -     PSA Screen    5. Mixed hyperlipidemia  -     Comprehensive metabolic panel  -     Lipid panel  -     CBC and Differential  -     TSH  -     T4, free  -     PSA Screen    6. Screening for prostate cancer  -     PSA Screen

## 2024-06-21 ENCOUNTER — PATIENT ROUNDING (BHMG ONLY) (OUTPATIENT)
Dept: FAMILY MEDICINE CLINIC | Facility: CLINIC | Age: 44
End: 2024-06-21
Payer: COMMERCIAL

## 2024-06-21 NOTE — PROGRESS NOTES
A My-Chart message has been sent to the patient for PATIENT ROUNDING with [facility]

## 2024-07-08 DIAGNOSIS — Z12.11 SCREENING FOR COLON CANCER: ICD-10-CM

## 2024-07-08 DIAGNOSIS — Z80.0 FAMILY HISTORY OF COLON CANCER IN FATHER: Primary | ICD-10-CM

## 2024-07-14 LAB
ALBUMIN SERPL-MCNC: 4.7 G/DL (ref 4.1–5.1)
ALP SERPL-CCNC: 162 IU/L (ref 44–121)
ALT SERPL-CCNC: 32 IU/L (ref 0–44)
AST SERPL-CCNC: 28 IU/L (ref 0–40)
BASOPHILS # BLD AUTO: 0 X10E3/UL (ref 0–0.2)
BASOPHILS NFR BLD AUTO: 1 %
BILIRUB SERPL-MCNC: 0.7 MG/DL (ref 0–1.2)
BUN SERPL-MCNC: 9 MG/DL (ref 6–24)
BUN/CREAT SERPL: 12 (ref 9–20)
CALCIUM SERPL-MCNC: 9.5 MG/DL (ref 8.7–10.2)
CHLORIDE SERPL-SCNC: 99 MMOL/L (ref 96–106)
CHOLEST SERPL-MCNC: 272 MG/DL (ref 100–199)
CO2 SERPL-SCNC: 25 MMOL/L (ref 20–29)
CREAT SERPL-MCNC: 0.77 MG/DL (ref 0.76–1.27)
EGFRCR SERPLBLD CKD-EPI 2021: 113 ML/MIN/1.73
EOSINOPHIL # BLD AUTO: 0.3 X10E3/UL (ref 0–0.4)
EOSINOPHIL NFR BLD AUTO: 4 %
ERYTHROCYTE [DISTWIDTH] IN BLOOD BY AUTOMATED COUNT: 13.4 % (ref 11.6–15.4)
GLOBULIN SER CALC-MCNC: 2.7 G/DL (ref 1.5–4.5)
GLUCOSE SERPL-MCNC: 87 MG/DL (ref 70–99)
HCT VFR BLD AUTO: 42.7 % (ref 37.5–51)
HDLC SERPL-MCNC: 50 MG/DL
HGB BLD-MCNC: 14 G/DL (ref 13–17.7)
IMM GRANULOCYTES # BLD AUTO: 0 X10E3/UL (ref 0–0.1)
IMM GRANULOCYTES NFR BLD AUTO: 0 %
LDLC SERPL CALC-MCNC: 157 MG/DL (ref 0–99)
LYMPHOCYTES # BLD AUTO: 2.7 X10E3/UL (ref 0.7–3.1)
LYMPHOCYTES NFR BLD AUTO: 42 %
MCH RBC QN AUTO: 28.2 PG (ref 26.6–33)
MCHC RBC AUTO-ENTMCNC: 32.8 G/DL (ref 31.5–35.7)
MCV RBC AUTO: 86 FL (ref 79–97)
MONOCYTES # BLD AUTO: 0.6 X10E3/UL (ref 0.1–0.9)
MONOCYTES NFR BLD AUTO: 9 %
NEUTROPHILS # BLD AUTO: 2.9 X10E3/UL (ref 1.4–7)
NEUTROPHILS NFR BLD AUTO: 44 %
PLATELET # BLD AUTO: 264 X10E3/UL (ref 150–450)
POTASSIUM SERPL-SCNC: 4.6 MMOL/L (ref 3.5–5.2)
PROT SERPL-MCNC: 7.4 G/DL (ref 6–8.5)
PSA SERPL-MCNC: 0.4 NG/ML (ref 0–4)
RBC # BLD AUTO: 4.97 X10E6/UL (ref 4.14–5.8)
SODIUM SERPL-SCNC: 139 MMOL/L (ref 134–144)
T4 FREE SERPL-MCNC: 1.21 NG/DL (ref 0.82–1.77)
TRIGL SERPL-MCNC: 349 MG/DL (ref 0–149)
TSH SERPL DL<=0.005 MIU/L-ACNC: 1.97 UIU/ML (ref 0.45–4.5)
VLDLC SERPL CALC-MCNC: 65 MG/DL (ref 5–40)
WBC # BLD AUTO: 6.5 X10E3/UL (ref 3.4–10.8)

## 2024-07-15 DIAGNOSIS — R74.8 ELEVATED ALKALINE PHOSPHATASE LEVEL: Primary | ICD-10-CM

## 2024-07-15 DIAGNOSIS — E78.1 HYPERTRIGLYCERIDEMIA: ICD-10-CM

## 2024-07-15 RX ORDER — FENOFIBRATE 160 MG/1
160 TABLET ORAL DAILY
Qty: 90 TABLET | Refills: 3 | Status: SHIPPED | OUTPATIENT
Start: 2024-07-15

## 2024-07-20 DIAGNOSIS — E78.1 HYPERTRIGLYCERIDEMIA: ICD-10-CM

## 2024-07-24 PROBLEM — Z12.11 SCREENING FOR COLON CANCER: Status: ACTIVE | Noted: 2024-07-08

## 2024-07-24 PROBLEM — Z80.0 FAMILY HISTORY OF COLON CANCER IN FATHER: Status: ACTIVE | Noted: 2024-07-08

## 2024-07-25 RX ORDER — FENOFIBRATE 160 MG/1
160 TABLET ORAL DAILY
Qty: 90 TABLET | Refills: 3 | Status: SHIPPED | OUTPATIENT
Start: 2024-07-25

## 2024-07-25 NOTE — TELEPHONE ENCOUNTER
Original prescription was sent to incorrect pharmacy. Please send to [facility] on [address]. I have updated this pharmacy in my chart and this request page.     Last ov  06/14/2024 rose lee

## 2024-07-26 ENCOUNTER — HOSPITAL ENCOUNTER (OUTPATIENT)
Facility: HOSPITAL | Age: 44
Discharge: HOME | End: 2024-07-26
Admitting: NURSE PRACTITIONER
Payer: COMMERCIAL

## 2024-07-26 PROCEDURE — 76705 ECHO EXAM OF ABDOMEN: CPT

## 2024-08-13 DIAGNOSIS — E78.1 HYPERTRIGLYCERIDEMIA: ICD-10-CM

## 2024-08-13 NOTE — TELEPHONE ENCOUNTER
"  Caller: Feliciano Billings \"Prashant\"    Relationship: Self    Best call back number:     [phone] (Home)       What is the best time to reach you: ANY     Who are you requesting to speak with (clinical staff, provider,  specific staff member): CLINICAL     Do you know the name of the person who called: N/A     What was the call regarding: PATIENT STATES THAT THE MEDICATION WAS CALLED TO WRONG PHARMACY AT FIRST, THEN THE INSURANCE WILL NOT APPROVE IT WHEN BEING RESENT TO CORRECT ONE. PATIENT IS REQUESTING A CALL BACK. THANKS.     Is it okay if the provider responds through [facility]: YES   "

## 2024-08-14 RX ORDER — FENOFIBRATE 160 MG/1
160 TABLET ORAL DAILY
Qty: 90 TABLET | Refills: 3 | OUTPATIENT
Start: 2024-08-14

## 2024-08-19 ENCOUNTER — ANESTHESIA EVENT (OUTPATIENT)
Dept: GASTROENTEROLOGY | Facility: HOSPITAL | Age: 44
End: 2024-08-19
Payer: COMMERCIAL

## 2024-08-19 ENCOUNTER — HOSPITAL ENCOUNTER (OUTPATIENT)
Facility: HOSPITAL | Age: 44
Setting detail: HOSPITAL OUTPATIENT SURGERY
Discharge: HOME | End: 2024-08-19
Attending: SURGERY | Admitting: SURGERY
Payer: COMMERCIAL

## 2024-08-19 ENCOUNTER — ANESTHESIA (OUTPATIENT)
Dept: GASTROENTEROLOGY | Facility: HOSPITAL | Age: 44
End: 2024-08-19
Payer: COMMERCIAL

## 2024-08-19 VITALS
HEART RATE: 89 BPM | BODY MASS INDEX: 26.43 KG/M2 | SYSTOLIC BLOOD PRESSURE: 122 MMHG | RESPIRATION RATE: 16 BRPM | HEIGHT: 68 IN | OXYGEN SATURATION: 100 % | WEIGHT: 174.4 LBS | DIASTOLIC BLOOD PRESSURE: 88 MMHG

## 2024-08-19 DIAGNOSIS — Z80.0 FAMILY HISTORY OF COLON CANCER IN FATHER: ICD-10-CM

## 2024-08-19 DIAGNOSIS — Z12.11 SCREENING FOR COLON CANCER: ICD-10-CM

## 2024-08-19 PROCEDURE — 25010000002 PROPOFOL 1000 MG/100ML EMULSION: Performed by: NURSE ANESTHETIST, CERTIFIED REGISTERED

## 2024-08-19 PROCEDURE — S0260 H&P FOR SURGERY: HCPCS | Performed by: SURGERY

## 2024-08-19 PROCEDURE — 25810000003 LACTATED RINGERS PER 1000 ML: Performed by: SURGERY

## 2024-08-19 PROCEDURE — 45385 COLONOSCOPY W/LESION REMOVAL: CPT | Performed by: SURGERY

## 2024-08-19 PROCEDURE — 25010000002 PROPOFOL 200 MG/20ML EMULSION: Performed by: NURSE ANESTHETIST, CERTIFIED REGISTERED

## 2024-08-19 PROCEDURE — 88305 TISSUE EXAM BY PATHOLOGIST: CPT | Performed by: SURGERY

## 2024-08-19 RX ORDER — PROPOFOL 10 MG/ML
INJECTION, EMULSION INTRAVENOUS CONTINUOUS PRN
Status: DISCONTINUED | OUTPATIENT
Start: 2024-08-19 | End: 2024-08-19 | Stop reason: SURG

## 2024-08-19 RX ORDER — PROPOFOL 10 MG/ML
INJECTION, EMULSION INTRAVENOUS AS NEEDED
Status: DISCONTINUED | OUTPATIENT
Start: 2024-08-19 | End: 2024-08-19 | Stop reason: SURG

## 2024-08-19 RX ORDER — SODIUM CHLORIDE, SODIUM LACTATE, POTASSIUM CHLORIDE, CALCIUM CHLORIDE 600; 310; 30; 20 MG/100ML; MG/100ML; MG/100ML; MG/100ML
1000 INJECTION, SOLUTION INTRAVENOUS CONTINUOUS
Status: DISCONTINUED | OUTPATIENT
Start: 2024-08-19 | End: 2024-08-19 | Stop reason: HOSPADM

## 2024-08-19 RX ORDER — LIDOCAINE HYDROCHLORIDE 20 MG/ML
INJECTION, SOLUTION INFILTRATION; PERINEURAL AS NEEDED
Status: DISCONTINUED | OUTPATIENT
Start: 2024-08-19 | End: 2024-08-19 | Stop reason: SURG

## 2024-08-19 RX ADMIN — LIDOCAINE HYDROCHLORIDE 3 ML: 20 INJECTION, SOLUTION INFILTRATION; PERINEURAL at 10:32

## 2024-08-19 RX ADMIN — PROPOFOL 160 MCG/KG/MIN: 10 INJECTION, EMULSION INTRAVENOUS at 10:32

## 2024-08-19 RX ADMIN — SODIUM CHLORIDE, POTASSIUM CHLORIDE, SODIUM LACTATE AND CALCIUM CHLORIDE 1000 ML: 600; 310; 30; 20 INJECTION, SOLUTION INTRAVENOUS at 09:39

## 2024-08-19 RX ADMIN — PROPOFOL INJECTABLE EMULSION 120 MG: 10 INJECTION, EMULSION INTRAVENOUS at 10:32

## 2024-08-19 RX ADMIN — SODIUM CHLORIDE, POTASSIUM CHLORIDE, SODIUM LACTATE AND CALCIUM CHLORIDE 1000 ML: 600; 310; 30; 20 INJECTION, SOLUTION INTRAVENOUS at 09:36

## 2024-08-19 NOTE — OP NOTE
Colorectal & General Surgery  Operative Report    Patient: Feliciano Billings  YOB: 1980  MRN: [id number]  DATE OF PROCEDURE: 08/19/24     PREOPERATIVE DIAGNOSIS:  Family history of colon cancer in his father  Colorectal cancer screening    POSTOPERATIVE DIAGNOSIS:  Rectosigmoid polyp    PROCEDURE:  Colonoscopy to cecum with hot snare polypectomy rectosigmoid polyp    FINDINGS:  1.1 cm multilobulated pedunculated rectosigmoid polyp approximately 19 cm from the anal verge resected completely with hot snare polypectomy and retrieved.  Hemostatic.    RECOMMENDATIONS:   Await pathology results    SURGEON:  Jayant Paulino MD    ANESTHESIA:  Monitored anesthesia care    EBL:  None    SPECIMEN:  Rectosigmoid polyp    OPERATIVE DESCRIPTION:  The patient was brought to the endoscopy suite under the care of the nursing staff.  A preoperative timeout was performed.  Monitored anesthesia care was initiated.  A digital rectal examination was performed.  The endoscope was inserted into the anus and advanced carefully to the cecum.  The endoscope was then withdrawn and the entire mucosal surface of the colon and rectum were visualized.  There is a multilobulated polyp identified in the rectosigmoid colon that was resected completely with hot snare polypectomy.  Retrieved.  Hemostatic.  Retroflexion was performed in the rectum.  The scope was then withdrawn.    The patient tolerated the procedure well and was transferred to the postanesthesia care unit in stable condition.    Jayant Paulino M.D.  Colorectal & General Surgery  [facility]    [address] [place] [address], [address]  [place], [place], [address]  P: [phone]  F: [phone]

## 2024-08-19 NOTE — DISCHARGE INSTRUCTIONS
For the next 24 hours patient needs to be with a responsible adult.    For 24 hours DO NOT drive, operate machinery, appliances, drink alcohol, make important decisions or sign legal documents.    Start with a light or bland diet if you are feeling sick to your stomach otherwise advance to regular diet as tolerated.    Follow recommendations on procedure report if provided by your doctor.    Call Dr Paulino for problems [phone]    Problems may include but not limited to: large amounts of bleeding, trouble breathing, repeated vomiting, severe unrelieved pain, fever or chills.

## 2024-08-19 NOTE — ANESTHESIA PREPROCEDURE EVALUATION
Anesthesia Evaluation     Patient summary reviewed and Nursing notes reviewed   NPO Solid Status: > 8 hours  NPO Liquid Status: > 4 hours           Airway   Mallampati: II  Neck ROM: full  No difficulty expected  Dental - normal exam     Pulmonary     breath sounds clear to auscultation  (+) a smoker Former,  Cardiovascular     Rhythm: regular    (+) hyperlipidemia      Neuro/Psych  (+) psychiatric history Anxiety  GI/Hepatic/Renal/Endo      Musculoskeletal     Abdominal    Substance History      OB/GYN          Other                    Anesthesia Plan    ASA 2     MAC     intravenous induction     Anesthetic plan, risks, benefits, and alternatives have been provided, discussed and informed consent has been obtained with: patient.    CODE STATUS:

## 2024-08-19 NOTE — ANESTHESIA POSTPROCEDURE EVALUATION
"Patient: Feliciano Billings    Procedure Summary       Date: 08/19/24 Room / Location: [facility] ENDOSCOPY 10 / [facility] ENDOSCOPY    Anesthesia Start: 1027 Anesthesia Stop: 1057    Procedure: COLONOSCOPY to hot snare polypectomy Diagnosis:       Family history of colon cancer in father      Screening for colon cancer      (Family history of colon cancer in father [Z80.0])      (Screening for colon cancer [Z12.11])    Surgeons: Dylon Paulino MD Provider: Kyree Felder MD    Anesthesia Type: MAC ASA Status: 2            Anesthesia Type: MAC    Vitals  Vitals Value Taken Time   /88 08/19/24 1117   Temp     Pulse 87 08/19/24 1118   Resp 16 08/19/24 1117   SpO2 98 % 08/19/24 1118   Vitals shown include unfiled device data.        Post Anesthesia Care and Evaluation    Patient location during evaluation: bedside  Patient participation: complete - patient participated  Level of consciousness: sleepy but conscious  Pain score: 0  Pain management: adequate    Airway patency: patent  Anesthetic complications: No anesthetic complications    Cardiovascular status: acceptable  Respiratory status: acceptable  Hydration status: acceptable    Comments: /88 (BP Location: Left arm, Patient Position: Sitting)   Pulse 89   Resp 16   Ht 172.7 cm (68\")   Wt 79.1 kg (174 lb 6.4 oz)   SpO2 100%   BMI 26.52 kg/m²       "

## 2024-08-19 NOTE — H&P
Colorectal & General Surgery  History and Physical    Patient: Feliciano Billings  YOB: 1980  MRN: [id number]      Assessment  Feliciano Billings is a 44 y.o. male with history of colon cancer in his father who presents for colon cancer screening.    Plan  Proceed with colonoscopy      History of Present Illness   Feliciano Billings is a 44 y.o. male who presents for colon cancer screening in the setting of family history of colon cancer in his father.    Past Medical History   Past Medical History:   Diagnosis Date    Allergic     H/O complete eye exam 06/2015    Hyperlipidemia     MRSA (methicillin resistant Staphylococcus aureus)     EAR/BUTTOCKS- 18 YRS AGO        Past Surgical History   Past Surgical History:   Procedure Laterality Date    BRAIN SURGERY      cranial stenosis    CLAVICLE SURGERY         Social History  Social History     Socioeconomic History    Marital status:    Tobacco Use    Smoking status: Former     Current packs/day: 1.00     Average packs/day: 1 pack/day for 20.0 years (20.0 ttl pk-yrs)     Types: Cigarettes    Smokeless tobacco: Never    Tobacco comments:     Quit in 9/2023   Substance and Sexual Activity    Alcohol use: Yes     Comment: SOCIAL    Drug use: Not Currently     Types: Amphetamines, Heroin     Comment: TAKES SUBOXONE    Sexual activity: Yes     Partners: Female     Birth control/protection: Other       Family History  Family History   Problem Relation Age of Onset    Thyroid disease Mother     Cancer Mother         Neuroendichrine    Cancer Father         Colorectal    Colon cancer Father     Diabetes Neg Hx     Heart disease Neg Hx     Hypertension Neg Hx     Alcohol abuse Neg Hx     Malig Hyperthermia Neg Hx        Review of Systems  Negative except as documented in the HPI.     Allergies  Allergies   Allergen Reactions    Sulfamethoxazole-Trimethoprim Anxiety, Dermatitis, Hives, Itching, Nausea Only, Palpitations, Photosensitivity, Rash, Shortness  Of Breath and Swelling       Medications    Current Facility-Administered Medications:     lactated ringers infusion 1,000 mL, 1,000 mL, Intravenous, Continuous, Dylon Paulino MD, Last Rate: 25 mL/hr at 08/19/24 0939, 1,000 mL at 08/19/24 0939    Vital Signs  Vitals:    08/19/24 0929   BP: 128/75   Pulse: 84   Resp: 16   SpO2: 98%        Physical Exam  Constitutional: Resting comfortably, no acute distress  Neck: Supple, trachea midline  Respiratory: No increased work of breathing, Symmetric excursion  Cardiovascular: Well pefursed, no jugular venous distention evident   Abdominal:  Soft, non-tender, non-distended  Lymphatics: No cervical or suprascapular adenopathy  Skin: Warm, dry, no rash on visualized skin surfaces  Musculoskeletal: Symmetric strength, no obvious gross abnormalities  Psychiatric: Alert and oriented ×3, normal affect          Jayant Paulino MD  Colorectal & General Surgery  [facility]    [address], [address]  [place], [place], [address]  P: [phone]  F: [phone]

## 2024-08-20 LAB
CYTO UR: NORMAL
LAB AP CASE REPORT: NORMAL
PATH REPORT.FINAL DX SPEC: NORMAL
PATH REPORT.GROSS SPEC: NORMAL

## 2024-08-21 ENCOUNTER — TELEPHONE (OUTPATIENT)
Dept: SURGERY | Facility: CLINIC | Age: 44
End: 2024-08-21
Payer: COMMERCIAL

## 2024-08-21 NOTE — TELEPHONE ENCOUNTER
I called and discussed his colonoscopy report.    Pathology consistent with a large 1.1 cm tubular adenoma.      Plan for repeat colonoscopy in 3 years.    Colonoscopy recall placed.

## (undated) DEVICE — KT ORCA ORCAPOD DISP STRL

## (undated) DEVICE — SENSR O2 OXIMAX FNGR A/ 18IN NONSTR

## (undated) DEVICE — ERBE NESSY®PLATE 170 SPLIT; 168CM²; CABLE 3M: Brand: ERBE

## (undated) DEVICE — THE SINGLE USE ETRAP – POLYP TRAP IS USED FOR SUCTION RETRIEVAL OF ENDOSCOPICALLY REMOVED POLYPS.: Brand: ETRAP

## (undated) DEVICE — ADAPT CLN BIOGUARD AIR/H2O DISP

## (undated) DEVICE — CANN O2 ETCO2 FITS ALL CONN CO2 SMPL A/ 7IN DISP LF

## (undated) DEVICE — SNAR POLYP SENSATION STDOVL 27 240 BX40

## (undated) DEVICE — TUBING, SUCTION, 1/4" X 10', STRAIGHT: Brand: MEDLINE